# Patient Record
Sex: FEMALE | Race: BLACK OR AFRICAN AMERICAN | NOT HISPANIC OR LATINO
[De-identification: names, ages, dates, MRNs, and addresses within clinical notes are randomized per-mention and may not be internally consistent; named-entity substitution may affect disease eponyms.]

---

## 2019-10-28 ENCOUNTER — APPOINTMENT (OUTPATIENT)
Dept: ANTEPARTUM | Facility: CLINIC | Age: 24
End: 2019-10-28
Payer: COMMERCIAL

## 2019-10-28 PROCEDURE — 76801 OB US < 14 WKS SINGLE FETUS: CPT

## 2019-10-28 PROCEDURE — 76813 OB US NUCHAL MEAS 1 GEST: CPT

## 2019-12-23 ENCOUNTER — APPOINTMENT (OUTPATIENT)
Dept: ANTEPARTUM | Facility: CLINIC | Age: 24
End: 2019-12-23
Payer: COMMERCIAL

## 2019-12-23 PROCEDURE — 76805 OB US >/= 14 WKS SNGL FETUS: CPT

## 2019-12-23 PROCEDURE — 76817 TRANSVAGINAL US OBSTETRIC: CPT

## 2020-03-02 ENCOUNTER — APPOINTMENT (OUTPATIENT)
Dept: ANTEPARTUM | Facility: CLINIC | Age: 25
End: 2020-03-02
Payer: COMMERCIAL

## 2020-03-02 PROCEDURE — 76819 FETAL BIOPHYS PROFIL W/O NST: CPT

## 2020-04-06 ENCOUNTER — APPOINTMENT (OUTPATIENT)
Dept: ANTEPARTUM | Facility: CLINIC | Age: 25
End: 2020-04-06
Payer: COMMERCIAL

## 2020-04-06 PROCEDURE — 76816 OB US FOLLOW-UP PER FETUS: CPT

## 2020-04-20 PROBLEM — Z00.00 ENCOUNTER FOR PREVENTIVE HEALTH EXAMINATION: Status: ACTIVE | Noted: 2020-04-20

## 2020-05-08 ENCOUNTER — TRANSCRIPTION ENCOUNTER (OUTPATIENT)
Age: 25
End: 2020-05-08

## 2020-05-09 ENCOUNTER — RESULT REVIEW (OUTPATIENT)
Age: 25
End: 2020-05-09

## 2020-05-09 ENCOUNTER — INPATIENT (INPATIENT)
Facility: HOSPITAL | Age: 25
LOS: 1 days | Discharge: HOME | End: 2020-05-11
Attending: OBSTETRICS & GYNECOLOGY | Admitting: OBSTETRICS & GYNECOLOGY
Payer: COMMERCIAL

## 2020-05-09 VITALS — WEIGHT: 200.4 LBS | HEIGHT: 60 IN

## 2020-05-09 DIAGNOSIS — Z3A.00 WEEKS OF GESTATION OF PREGNANCY NOT SPECIFIED: ICD-10-CM

## 2020-05-09 DIAGNOSIS — O26.899 OTHER SPECIFIED PREGNANCY RELATED CONDITIONS, UNSPECIFIED TRIMESTER: ICD-10-CM

## 2020-05-09 LAB
BASOPHILS # BLD AUTO: 0.03 K/UL — SIGNIFICANT CHANGE UP (ref 0–0.2)
BASOPHILS NFR BLD AUTO: 0.2 % — SIGNIFICANT CHANGE UP (ref 0–2)
EOSINOPHIL # BLD AUTO: 0.13 K/UL — SIGNIFICANT CHANGE UP (ref 0–0.5)
EOSINOPHIL NFR BLD AUTO: 0.9 % — SIGNIFICANT CHANGE UP (ref 0–6)
HCT VFR BLD CALC: 36.6 % — SIGNIFICANT CHANGE UP (ref 34.5–45)
HGB BLD-MCNC: 11.9 G/DL — SIGNIFICANT CHANGE UP (ref 11.5–15.5)
IMM GRANULOCYTES NFR BLD AUTO: 0.7 % — SIGNIFICANT CHANGE UP (ref 0–1.5)
LYMPHOCYTES # BLD AUTO: 1.72 K/UL — SIGNIFICANT CHANGE UP (ref 1–3.3)
LYMPHOCYTES # BLD AUTO: 12.1 % — LOW (ref 13–44)
MCHC RBC-ENTMCNC: 30.1 PG — SIGNIFICANT CHANGE UP (ref 27–34)
MCHC RBC-ENTMCNC: 32.5 GM/DL — SIGNIFICANT CHANGE UP (ref 32–36)
MCV RBC AUTO: 92.4 FL — SIGNIFICANT CHANGE UP (ref 80–100)
MONOCYTES # BLD AUTO: 1.09 K/UL — HIGH (ref 0–0.9)
MONOCYTES NFR BLD AUTO: 7.7 % — SIGNIFICANT CHANGE UP (ref 2–14)
NEUTROPHILS # BLD AUTO: 11.17 K/UL — HIGH (ref 1.8–7.4)
NEUTROPHILS NFR BLD AUTO: 78.4 % — HIGH (ref 43–77)
NRBC # BLD: 0 /100 WBCS — SIGNIFICANT CHANGE UP (ref 0–0)
PLATELET # BLD AUTO: 318 K/UL — SIGNIFICANT CHANGE UP (ref 150–400)
RBC # BLD: 3.96 M/UL — SIGNIFICANT CHANGE UP (ref 3.8–5.2)
RBC # FLD: 13.2 % — SIGNIFICANT CHANGE UP (ref 10.3–14.5)
RH IG SCN BLD-IMP: POSITIVE — SIGNIFICANT CHANGE UP
SARS-COV-2 RNA SPEC QL NAA+PROBE: DETECTED
T PALLIDUM AB TITR SER: NEGATIVE — SIGNIFICANT CHANGE UP
WBC # BLD: 14.24 K/UL — HIGH (ref 3.8–10.5)
WBC # FLD AUTO: 14.24 K/UL — HIGH (ref 3.8–10.5)

## 2020-05-09 PROCEDURE — 88307 TISSUE EXAM BY PATHOLOGIST: CPT | Mod: 26

## 2020-05-09 RX ORDER — SIMETHICONE 80 MG/1
80 TABLET, CHEWABLE ORAL EVERY 4 HOURS
Refills: 0 | Status: DISCONTINUED | OUTPATIENT
Start: 2020-05-09 | End: 2020-05-11

## 2020-05-09 RX ORDER — SODIUM CHLORIDE 9 MG/ML
1000 INJECTION, SOLUTION INTRAVENOUS ONCE
Refills: 0 | Status: COMPLETED | OUTPATIENT
Start: 2020-05-09 | End: 2020-05-09

## 2020-05-09 RX ORDER — CEFAZOLIN SODIUM 1 G
2000 VIAL (EA) INJECTION ONCE
Refills: 0 | Status: COMPLETED | OUTPATIENT
Start: 2020-05-09 | End: 2020-05-09

## 2020-05-09 RX ORDER — DIPHENHYDRAMINE HCL 50 MG
25 CAPSULE ORAL EVERY 6 HOURS
Refills: 0 | Status: DISCONTINUED | OUTPATIENT
Start: 2020-05-09 | End: 2020-05-11

## 2020-05-09 RX ORDER — LANOLIN
1 OINTMENT (GRAM) TOPICAL EVERY 6 HOURS
Refills: 0 | Status: DISCONTINUED | OUTPATIENT
Start: 2020-05-09 | End: 2020-05-11

## 2020-05-09 RX ORDER — AMPICILLIN TRIHYDRATE 250 MG
1 CAPSULE ORAL EVERY 4 HOURS
Refills: 0 | Status: DISCONTINUED | OUTPATIENT
Start: 2020-05-09 | End: 2020-05-09

## 2020-05-09 RX ORDER — OXYCODONE HYDROCHLORIDE 5 MG/1
5 TABLET ORAL
Refills: 0 | Status: DISCONTINUED | OUTPATIENT
Start: 2020-05-09 | End: 2020-05-11

## 2020-05-09 RX ORDER — ONDANSETRON 8 MG/1
4 TABLET, FILM COATED ORAL EVERY 6 HOURS
Refills: 0 | Status: DISCONTINUED | OUTPATIENT
Start: 2020-05-09 | End: 2020-05-09

## 2020-05-09 RX ORDER — OXYTOCIN 10 UNIT/ML
333.33 VIAL (ML) INJECTION
Qty: 20 | Refills: 0 | Status: DISCONTINUED | OUTPATIENT
Start: 2020-05-09 | End: 2020-05-09

## 2020-05-09 RX ORDER — OXYTOCIN 10 UNIT/ML
1 VIAL (ML) INJECTION
Qty: 30 | Refills: 0 | Status: DISCONTINUED | OUTPATIENT
Start: 2020-05-09 | End: 2020-05-09

## 2020-05-09 RX ORDER — SODIUM CHLORIDE 9 MG/ML
1000 INJECTION, SOLUTION INTRAVENOUS
Refills: 0 | Status: DISCONTINUED | OUTPATIENT
Start: 2020-05-09 | End: 2020-05-11

## 2020-05-09 RX ORDER — TETANUS TOXOID, REDUCED DIPHTHERIA TOXOID AND ACELLULAR PERTUSSIS VACCINE, ADSORBED 5; 2.5; 8; 8; 2.5 [IU]/.5ML; [IU]/.5ML; UG/.5ML; UG/.5ML; UG/.5ML
0.5 SUSPENSION INTRAMUSCULAR ONCE
Refills: 0 | Status: DISCONTINUED | OUTPATIENT
Start: 2020-05-09 | End: 2020-05-11

## 2020-05-09 RX ORDER — KETOROLAC TROMETHAMINE 30 MG/ML
30 SYRINGE (ML) INJECTION EVERY 6 HOURS
Refills: 0 | Status: DISCONTINUED | OUTPATIENT
Start: 2020-05-09 | End: 2020-05-10

## 2020-05-09 RX ORDER — ENOXAPARIN SODIUM 100 MG/ML
40 INJECTION SUBCUTANEOUS EVERY 24 HOURS
Refills: 0 | Status: DISCONTINUED | OUTPATIENT
Start: 2020-05-10 | End: 2020-05-11

## 2020-05-09 RX ORDER — FENTANYL/BUPIVACAINE/NS/PF 2MCG/ML-.1
250 PLASTIC BAG, INJECTION (ML) INJECTION
Refills: 0 | Status: DISCONTINUED | OUTPATIENT
Start: 2020-05-09 | End: 2020-05-09

## 2020-05-09 RX ORDER — ACETAMINOPHEN 500 MG
975 TABLET ORAL
Refills: 0 | Status: DISCONTINUED | OUTPATIENT
Start: 2020-05-09 | End: 2020-05-11

## 2020-05-09 RX ORDER — CITRIC ACID/SODIUM CITRATE 300-500 MG
15 SOLUTION, ORAL ORAL EVERY 6 HOURS
Refills: 0 | Status: DISCONTINUED | OUTPATIENT
Start: 2020-05-09 | End: 2020-05-09

## 2020-05-09 RX ORDER — OXYTOCIN 10 UNIT/ML
333.33 VIAL (ML) INJECTION
Qty: 20 | Refills: 0 | Status: DISCONTINUED | OUTPATIENT
Start: 2020-05-09 | End: 2020-05-11

## 2020-05-09 RX ORDER — SODIUM CHLORIDE 9 MG/ML
1000 INJECTION, SOLUTION INTRAVENOUS
Refills: 0 | Status: DISCONTINUED | OUTPATIENT
Start: 2020-05-09 | End: 2020-05-09

## 2020-05-09 RX ORDER — NALOXONE HYDROCHLORIDE 4 MG/.1ML
0.1 SPRAY NASAL
Refills: 0 | Status: DISCONTINUED | OUTPATIENT
Start: 2020-05-09 | End: 2020-05-09

## 2020-05-09 RX ORDER — DEXAMETHASONE 0.5 MG/5ML
4 ELIXIR ORAL EVERY 6 HOURS
Refills: 0 | Status: DISCONTINUED | OUTPATIENT
Start: 2020-05-09 | End: 2020-05-09

## 2020-05-09 RX ORDER — IBUPROFEN 200 MG
600 TABLET ORAL EVERY 6 HOURS
Refills: 0 | Status: COMPLETED | OUTPATIENT
Start: 2020-05-09 | End: 2021-04-07

## 2020-05-09 RX ORDER — AZITHROMYCIN 500 MG/1
500 TABLET, FILM COATED ORAL ONCE
Refills: 0 | Status: COMPLETED | OUTPATIENT
Start: 2020-05-09 | End: 2020-05-09

## 2020-05-09 RX ORDER — OXYCODONE HYDROCHLORIDE 5 MG/1
5 TABLET ORAL ONCE
Refills: 0 | Status: DISCONTINUED | OUTPATIENT
Start: 2020-05-09 | End: 2020-05-11

## 2020-05-09 RX ORDER — AMPICILLIN TRIHYDRATE 250 MG
2 CAPSULE ORAL ONCE
Refills: 0 | Status: COMPLETED | OUTPATIENT
Start: 2020-05-09 | End: 2020-05-09

## 2020-05-09 RX ORDER — MAGNESIUM HYDROXIDE 400 MG/1
30 TABLET, CHEWABLE ORAL
Refills: 0 | Status: DISCONTINUED | OUTPATIENT
Start: 2020-05-09 | End: 2020-05-11

## 2020-05-09 RX ADMIN — SODIUM CHLORIDE 125 MILLILITER(S): 9 INJECTION, SOLUTION INTRAVENOUS at 09:35

## 2020-05-09 RX ADMIN — Medication 108 GRAM(S): at 13:30

## 2020-05-09 RX ADMIN — Medication 108 GRAM(S): at 18:00

## 2020-05-09 RX ADMIN — SODIUM CHLORIDE 1000 MILLILITER(S): 9 INJECTION, SOLUTION INTRAVENOUS at 14:00

## 2020-05-09 RX ADMIN — Medication 216 GRAM(S): at 09:35

## 2020-05-09 RX ADMIN — AZITHROMYCIN 255 MILLIGRAM(S): 500 TABLET, FILM COATED ORAL at 21:00

## 2020-05-09 RX ADMIN — Medication 1 MILLIUNIT(S)/MIN: at 10:02

## 2020-05-09 RX ADMIN — Medication 250 MILLILITER(S): at 11:38

## 2020-05-09 RX ADMIN — Medication 30 MILLIGRAM(S): at 22:49

## 2020-05-09 RX ADMIN — SODIUM CHLORIDE 125 MILLILITER(S): 9 INJECTION, SOLUTION INTRAVENOUS at 19:50

## 2020-05-09 RX ADMIN — Medication 100 MILLIGRAM(S): at 20:55

## 2020-05-09 RX ADMIN — Medication 1000 MILLIUNIT(S)/MIN: at 22:19

## 2020-05-10 LAB
BASOPHILS # BLD AUTO: 0.02 K/UL — SIGNIFICANT CHANGE UP (ref 0–0.2)
BASOPHILS NFR BLD AUTO: 0.1 % — SIGNIFICANT CHANGE UP (ref 0–2)
EOSINOPHIL # BLD AUTO: 0 K/UL — SIGNIFICANT CHANGE UP (ref 0–0.5)
EOSINOPHIL NFR BLD AUTO: 0 % — SIGNIFICANT CHANGE UP (ref 0–6)
HCT VFR BLD CALC: 26.9 % — LOW (ref 34.5–45)
HGB BLD-MCNC: 8.7 G/DL — LOW (ref 11.5–15.5)
IMM GRANULOCYTES NFR BLD AUTO: 0.8 % — SIGNIFICANT CHANGE UP (ref 0–1.5)
LYMPHOCYTES # BLD AUTO: 0.76 K/UL — LOW (ref 1–3.3)
LYMPHOCYTES # BLD AUTO: 4.5 % — LOW (ref 13–44)
MCHC RBC-ENTMCNC: 30.3 PG — SIGNIFICANT CHANGE UP (ref 27–34)
MCHC RBC-ENTMCNC: 32.3 GM/DL — SIGNIFICANT CHANGE UP (ref 32–36)
MCV RBC AUTO: 93.7 FL — SIGNIFICANT CHANGE UP (ref 80–100)
MONOCYTES # BLD AUTO: 0.65 K/UL — SIGNIFICANT CHANGE UP (ref 0–0.9)
MONOCYTES NFR BLD AUTO: 3.9 % — SIGNIFICANT CHANGE UP (ref 2–14)
NEUTROPHILS # BLD AUTO: 15.2 K/UL — HIGH (ref 1.8–7.4)
NEUTROPHILS NFR BLD AUTO: 90.7 % — HIGH (ref 43–77)
NRBC # BLD: 0 /100 WBCS — SIGNIFICANT CHANGE UP (ref 0–0)
PLATELET # BLD AUTO: 238 K/UL — SIGNIFICANT CHANGE UP (ref 150–400)
RBC # BLD: 2.87 M/UL — LOW (ref 3.8–5.2)
RBC # FLD: 13.2 % — SIGNIFICANT CHANGE UP (ref 10.3–14.5)
WBC # BLD: 16.76 K/UL — HIGH (ref 3.8–10.5)
WBC # FLD AUTO: 16.76 K/UL — HIGH (ref 3.8–10.5)

## 2020-05-10 RX ORDER — IBUPROFEN 200 MG
600 TABLET ORAL EVERY 6 HOURS
Refills: 0 | Status: DISCONTINUED | OUTPATIENT
Start: 2020-05-10 | End: 2020-05-11

## 2020-05-10 RX ADMIN — Medication 30 MILLIGRAM(S): at 12:36

## 2020-05-10 RX ADMIN — Medication 600 MILLIGRAM(S): at 23:51

## 2020-05-10 RX ADMIN — Medication 975 MILLIGRAM(S): at 09:48

## 2020-05-10 RX ADMIN — Medication 30 MILLIGRAM(S): at 06:29

## 2020-05-10 RX ADMIN — Medication 975 MILLIGRAM(S): at 20:38

## 2020-05-10 RX ADMIN — Medication 600 MILLIGRAM(S): at 18:03

## 2020-05-10 RX ADMIN — Medication 975 MILLIGRAM(S): at 15:43

## 2020-05-10 RX ADMIN — ENOXAPARIN SODIUM 40 MILLIGRAM(S): 100 INJECTION SUBCUTANEOUS at 15:42

## 2020-05-11 ENCOUNTER — TRANSCRIPTION ENCOUNTER (OUTPATIENT)
Age: 25
End: 2020-05-11

## 2020-05-11 VITALS
OXYGEN SATURATION: 99 % | RESPIRATION RATE: 18 BRPM | HEART RATE: 73 BPM | TEMPERATURE: 98 F | DIASTOLIC BLOOD PRESSURE: 67 MMHG | SYSTOLIC BLOOD PRESSURE: 103 MMHG

## 2020-05-11 PROCEDURE — 86780 TREPONEMA PALLIDUM: CPT

## 2020-05-11 PROCEDURE — 88307 TISSUE EXAM BY PATHOLOGIST: CPT

## 2020-05-11 PROCEDURE — 86901 BLOOD TYPING SEROLOGIC RH(D): CPT

## 2020-05-11 PROCEDURE — 99214 OFFICE O/P EST MOD 30 MIN: CPT

## 2020-05-11 PROCEDURE — 36415 COLL VENOUS BLD VENIPUNCTURE: CPT

## 2020-05-11 PROCEDURE — 87635 SARS-COV-2 COVID-19 AMP PRB: CPT

## 2020-05-11 PROCEDURE — 85025 COMPLETE CBC W/AUTO DIFF WBC: CPT

## 2020-05-11 PROCEDURE — 86850 RBC ANTIBODY SCREEN: CPT

## 2020-05-11 RX ORDER — ACETAMINOPHEN 500 MG
3 TABLET ORAL
Qty: 0 | Refills: 0 | DISCHARGE
Start: 2020-05-11

## 2020-05-11 RX ORDER — IBUPROFEN 200 MG
1 TABLET ORAL
Qty: 0 | Refills: 0 | DISCHARGE
Start: 2020-05-11

## 2020-05-11 RX ADMIN — Medication 600 MILLIGRAM(S): at 11:57

## 2020-05-11 RX ADMIN — Medication 600 MILLIGRAM(S): at 05:53

## 2020-05-11 RX ADMIN — Medication 975 MILLIGRAM(S): at 09:50

## 2020-05-11 RX ADMIN — Medication 975 MILLIGRAM(S): at 15:35

## 2020-05-11 NOTE — DISCHARGE NOTE OB - HOSPITAL COURSE
Uneventful postoperative course.  Asymptomatic COVID positive, found on screening.  Stable for discharge.

## 2020-05-11 NOTE — PROGRESS NOTE ADULT - ASSESSMENT
A/P: 24y s/p  section, POD#2, stable. Asymptomatic COVID 19 infection.  -  Pain: PO motrin q6hrs, tylenol q8hrs, oxycodone for severe pain PRN  -  Post-operatively labs: post-op Hgb 8.5, hemodynamically stable, no symptoms of anemia   -  GI: tolerating regular diet, passing gas  -  : s/p dhillon , urinating without difficulty  -  DVT prophylaxis: encouraged increased ambulation, SCDs, Lovenox  -  Dispo: Today, unless mentioned otherwise

## 2020-05-11 NOTE — PROGRESS NOTE ADULT - SUBJECTIVE AND OBJECTIVE BOX
Patient evaluated at bedside this morning, resting comfortable in bed.   She reports pain is well controlled.  She denies headache, dizziness, chest pain, palpitations, shortness of breathe, nausea, vomiting or heavy vaginal bleeding.  She has been ambulating without assistance, voiding spontaneously, passing gas, tolerating regular diet and is breastfeeding.    Physical Exam:  Vital Signs Last 24 Hrs  T(C): 36.9 (11 May 2020 02:00), Max: 37.1 (10 May 2020 10:00)  T(F): 98.5 (11 May 2020 02:00), Max: 98.8 (10 May 2020 10:00)  HR: 90 (11 May 2020 02:00) (68 - 90)  BP: 98/64 (11 May 2020 02:00) (97/68 - 103/60)  BP(mean): --  RR: 16 (11 May 2020 02:00) (16 - 18)  SpO2: 98% (11 May 2020 02:00) (97% - 98%)    GA: NAD, A+0 x 3  Abd: + BS, soft, nontender, nondistended, no rebound or guarding, incision clean, dry and intact, uterus firm at midline and below umbilicus  : lochia WNL  Extremities: no swelling or calf tenderness                             8.7    16.76 )-----------( 238      ( 10 May 2020 07:04 )             26.9

## 2020-05-11 NOTE — DISCHARGE NOTE OB - PATIENT PORTAL LINK FT
You can access the FollowMyHealth Patient Portal offered by  by registering at the following website: [email or web address]. By joining [facility]’s FollowMyHealth portal, you will also be able to view your health information using other applications (apps) compatible with our system.

## 2020-05-11 NOTE — DISCHARGE NOTE OB - CARE PROVIDER_API CALL
Matt Centeno)  Obstetrics and Gynecology  [address] [place] [address]  Phone: [phone]  Fax: [phone]  Follow Up Time:

## 2020-05-12 ENCOUNTER — APPOINTMENT (OUTPATIENT)
Dept: ANTEPARTUM | Facility: CLINIC | Age: 25
End: 2020-05-12

## 2020-05-15 LAB — SURGICAL PATHOLOGY STUDY: SIGNIFICANT CHANGE UP

## 2020-05-22 DIAGNOSIS — O32.4XX0 MATERNAL CARE FOR HIGH HEAD AT TERM, NOT APPLICABLE OR UNSPECIFIED: ICD-10-CM

## 2020-05-22 DIAGNOSIS — J45.909 UNSPECIFIED ASTHMA, UNCOMPLICATED: ICD-10-CM

## 2020-05-22 DIAGNOSIS — Z34.03 ENCOUNTER FOR SUPERVISION OF NORMAL FIRST PREGNANCY, THIRD TRIMESTER: ICD-10-CM

## 2020-05-22 DIAGNOSIS — U07.1 COVID-19: ICD-10-CM

## 2020-05-22 DIAGNOSIS — Z3A.39 39 WEEKS GESTATION OF PREGNANCY: ICD-10-CM

## 2021-07-16 ENCOUNTER — TRANSCRIPTION ENCOUNTER (OUTPATIENT)
Age: 26
End: 2021-07-16

## 2021-07-16 ENCOUNTER — EMERGENCY (EMERGENCY)
Facility: HOSPITAL | Age: 26
LOS: 1 days | Discharge: HOME | End: 2021-07-16
Admitting: EMERGENCY MEDICINE
Payer: COMMERCIAL

## 2021-07-16 VITALS
SYSTOLIC BLOOD PRESSURE: 118 MMHG | HEIGHT: 60 IN | OXYGEN SATURATION: 98 % | DIASTOLIC BLOOD PRESSURE: 78 MMHG | TEMPERATURE: 99 F | RESPIRATION RATE: 16 BRPM | WEIGHT: 132.06 LBS | HEART RATE: 84 BPM

## 2021-07-16 DIAGNOSIS — M79.10 MYALGIA, UNSPECIFIED SITE: ICD-10-CM

## 2021-07-16 DIAGNOSIS — J06.9 ACUTE UPPER RESPIRATORY INFECTION, UNSPECIFIED: ICD-10-CM

## 2021-07-16 DIAGNOSIS — Z20.822 CONTACT WITH AND (SUSPECTED) EXPOSURE TO COVID-19: ICD-10-CM

## 2021-07-16 LAB — SARS-COV-2 RNA SPEC QL NAA+PROBE: SIGNIFICANT CHANGE UP

## 2021-07-16 PROCEDURE — 87635 SARS-COV-2 COVID-19 AMP PRB: CPT

## 2021-07-16 PROCEDURE — 99283 EMERGENCY DEPT VISIT LOW MDM: CPT

## 2021-07-16 PROCEDURE — 99284 EMERGENCY DEPT VISIT MOD MDM: CPT

## 2021-07-16 NOTE — ED PROVIDER NOTE - OBJECTIVE STATEMENT
The pt is a 25 y/o F, who presents to ED c/o congestion, h/a, myalgias and dry cough x few hrs. Pt has not taken any meds for symptoms, child sick w/same symptoms. Denies cp, sob, n/v/d, abd pain, rash, fever.

## 2021-07-16 NOTE — ED PROVIDER NOTE - NSFOLLOWUPINSTRUCTIONS_ED_ALL_ED_FT
REST, HYDRATE, TAKE TYLENOL OR IBUPROFEN AS NEEDED, YOUR COVID TEST RESULTS WERE NOT AVAILABLE AT TIME OF DISCHARGE BUT IF POSITIVE YOU WILL BE CONTACTED    Viral Respiratory Infection    A viral respiratory infection is an illness that affects parts of the body used for breathing, like the lungs, nose, and throat. It is caused by a germ called a virus. Symptoms can include runny nose, coughing, sneezing, fatigue, body aches, sore throat, fever, or headache. Over the counter medicine can be used to manage the symptoms but the infection typically goes away on its own in 5 to 10 days.     SEEK IMMEDIATE MEDICAL CARE IF YOU HAVE ANY OF THE FOLLOWING SYMPTOMS: shortness of breath, chest pain, fever over 10 days, or lightheadedness/dizziness.

## 2021-07-16 NOTE — ED ADULT NURSE NOTE - OBJECTIVE STATEMENT
pt c/o chest tightness and head congestion for last 4 hours.  pt not vaccinated but did have covid last year with child birth

## 2021-07-16 NOTE — ED PROVIDER NOTE - CLINICAL SUMMARY MEDICAL DECISION MAKING FREE TEXT BOX
pt w/uri symptoms - nasal congestion, scratchy throat, dry cough and myalgias x few hrs, well appearing, non toxic, afebrile, will swab for covid, symptom control and supportive care discussed at length, no indication for any imaging or labs, f/u w/pmd, pt understands and agrees w/plan

## 2021-07-16 NOTE — ED PROVIDER NOTE - PATIENT PORTAL LINK FT
You can access the FollowMyHealth Patient Portal offered by [facility] by registering at the following website: [email or web address]. By joining [facility]’s FollowMyHealth portal, you will also be able to view your health information using other applications (apps) compatible with our system.

## 2022-06-22 ENCOUNTER — APPOINTMENT (OUTPATIENT)
Dept: BREAST CENTER | Facility: CLINIC | Age: 27
End: 2022-06-22

## 2022-06-23 ENCOUNTER — NON-APPOINTMENT (OUTPATIENT)
Age: 27
End: 2022-06-23

## 2022-06-23 NOTE — ASSESSMENT
[FreeTextEntry1] : 26 year old female referred by Dr. 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 OZZY _%. \par \par Discussed patient's diagnosis in detail and answered any questions. Plan for right localized breast excision, pending PCP clearance.?? Surgical consent obtained today, surgical coordinator aware. Patient verbalized understanding and agreement of plan.\par

## 2022-06-23 NOTE — HISTORY OF PRESENT ILLNESS
[FreeTextEntry1] : 26 year old female referred by Dr. 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 \par \par Denies prior breast surgeries.??\par Patient denies nipple discharge, skin changes.??\par Patient denies family history of breast or ovarian cancer.??\par \par Jo Lifetime Risk % ?? \par \par Patient denies history of heart disease, HTN, DM, blood clots, sleep apnea, COPD, lose/missing teeth, issues with anesthesia. Patient denies any known drug allergies.???\par

## 2022-06-23 NOTE — DATA REVIEWED
[FreeTextEntry1] : 3/23/22 ([facility]) B/L US: \par -A palpable and painful 4.6cm mass in the right breast at 10:00 8cmfn has enlarged from 2/2019 where it measured 3.6cm maximum dimension. Prior US-guided needle biopsy yielding fibroadenoma 11/2015. RECOMMENDATION: Given the interval growth in size, breast surgery consultation is recommended and excision should be considered. \par -An 0.8cm mass in the right breast 3:00 retroareolar position is probably benign. RECOMMENDATION: 6 month follow-up R breast US \par -Numerous B/L previously seen masses are stable. \par BIRADS-4: Suspicious

## 2022-06-23 NOTE — PHYSICAL EXAM
[Supple] : supple [No Supraclavicular Adenopathy] : no supraclavicular adenopathy [Examined in the supine and seated position] : examined in the supine and seated position [No dominant masses] : no dominant masses left breast [No Nipple Retraction] : no left nipple retraction [No Nipple Discharge] : no left nipple discharge [No Axillary Lymphadenopathy] : no left axillary lymphadenopathy [de-identified] : palpable biopsied mass at 10:00 8cmfn

## 2022-08-19 PROBLEM — D24.1 FIBROADENOMA OF RIGHT BREAST: Status: ACTIVE | Noted: 2022-06-20

## 2022-08-24 ENCOUNTER — APPOINTMENT (OUTPATIENT)
Dept: BREAST CENTER | Facility: CLINIC | Age: 27
End: 2022-08-24

## 2022-08-24 ENCOUNTER — NON-APPOINTMENT (OUTPATIENT)
Age: 27
End: 2022-08-24

## 2022-08-24 DIAGNOSIS — D24.1 BENIGN NEOPLASM OF RIGHT BREAST: ICD-10-CM

## 2022-08-25 NOTE — DATA REVIEWED
[FreeTextEntry1] : 3/23/22 ([facility]) B/L US: \par -A palpable and painful 4.6cm mass in the right breast at 10:00 8cmfn has enlarged from 2/2019 where it measured 3.6cm maximum dimension. Prior US-guided needle biopsy yielding fibroadenoma 11/2015. RECOMMENDATION: Given the interval growth in size, breast surgery consultation is recommended and excision should be considered. \par -An 0.8cm mass in the right breast 3:00 retroareolar position is probably benign. RECOMMENDATION: 6 month follow-up R breast US \par -Numerous B/L previously seen masses are stable. \par BIRADS-4: Suspicious.

## 2022-08-25 NOTE — HISTORY OF PRESENT ILLNESS
[FreeTextEntry1] : 27 year old female was referred by Dr. 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 \par \par Denies prior breast surgeries or prior biopsies.??\par Patient denies palpable masses, nipple discharge, skin changes.??\par Patient denies family history of breast or ovarian cancer.??\par \par Jo Lifetime Risk %??\par \par Patient denies history of heart disease, HTN, DM, blood clots, sleep apnea, COPD, loose/missing/infected teeth, issues with anesthesia. Patient denies any known drug allergies.??

## 2022-08-25 NOTE — PHYSICAL EXAM
[Supple] : supple [No Supraclavicular Adenopathy] : no supraclavicular adenopathy [Examined in the supine and seated position] : examined in the supine and seated position [Asymmetrical] : asymmetrical [No dominant masses] : no dominant masses left breast [No Nipple Retraction] : no left nipple retraction [No Nipple Discharge] : no left nipple discharge [No Axillary Lymphadenopathy] : no left axillary lymphadenopathy [de-identified] : palpable 4.6cm fibroadenoma 10:00 8cmfn

## 2022-08-25 NOTE — ASSESSMENT
[FreeTextEntry1] : 27 year old female was referred by Dr. 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 \par \par Discussed patient's diagnosis in detail and answered any questions. Plan for R breast excisional biopsy, pending PCP clearance. Surgical consent obtained today, surgical coordinator aware. Patient verbalized understanding and agreement of plan.??\par

## 2022-09-14 NOTE — ED ADULT TRIAGE NOTE - STATUS:
Problem: Adult Inpatient Plan of Care  Goal: Plan of Care Review  Outcome: Met  Goal: Patient-Specific Goal (Individualized)  Outcome: Met  Goal: Absence of Hospital-Acquired Illness or Injury  Outcome: Met  Goal: Optimal Comfort and Wellbeing  Outcome: Met  Goal: Readiness for Transition of Care  Outcome: Met     Problem: Bariatric Environmental Safety  Goal: Safety Maintained with Care  Outcome: Met     Problem: Fluid Imbalance (Pneumonia)  Goal: Fluid Balance  Outcome: Met     Problem: Infection (Pneumonia)  Goal: Resolution of Infection Signs and Symptoms  Outcome: Met     Problem: Respiratory Compromise (Pneumonia)  Goal: Effective Oxygenation and Ventilation  Outcome: Met      Applied

## 2022-11-22 ENCOUNTER — NON-APPOINTMENT (OUTPATIENT)
Age: 27
End: 2022-11-22